# Patient Record
Sex: FEMALE | Race: WHITE
[De-identification: names, ages, dates, MRNs, and addresses within clinical notes are randomized per-mention and may not be internally consistent; named-entity substitution may affect disease eponyms.]

---

## 2018-03-27 ENCOUNTER — HOSPITAL ENCOUNTER (OUTPATIENT)
Dept: HOSPITAL 47 - RADCTMAIN | Age: 33
Discharge: HOME | End: 2018-03-27
Payer: COMMERCIAL

## 2018-03-27 DIAGNOSIS — R10.9: Primary | ICD-10-CM

## 2018-03-27 PROCEDURE — 74175 CTA ABDOMEN W/CONTRAST: CPT

## 2018-03-27 NOTE — CT
EXAMINATION TYPE: CT angio abdomen

 

DATE OF EXAM: 3/27/2018 4:56 PM

 

COMPARISON: NONE

 

HISTORY: Patient complains of LUQ pain, RLQ pain, diarrhea, and constipation. Father was recently carolyn
gnosed with median arcuate ligament syndrome.

 

CT DLP: 497 mGycm

Automated exposure control for dose reduction was used.

 

TECHNIQUE: 

Performed without and with IV Contrast, patient injected with 100 mL of Isovue 370.

. 

 

FINDINGS: 

Lung bases are clear of consolidation. There is no pleural effusion. Heart size is normal. Liver sple
en pancreas gallbladder appear normal. Kidneys have normal size and contour. There is normal contrast
 opacification of the kidneys.

 

The abdominal aorta has normal size. There is no evidence of aneurysm or dissection. There is bilater
al wide patency of the common internal and external iliac arteries in the pelvis. There is no retrope
ritoneal adenopathy.

 

There is wide patency of the celiac artery and the superior mesenteric artery. There is wide patency 
of the renal arteries.

 

IMPRESSION: 

NORMAL CT ANGIOGRAM OF THE ABDOMINAL AORTA.

## 2018-09-18 ENCOUNTER — HOSPITAL ENCOUNTER (OUTPATIENT)
Dept: HOSPITAL 47 - RADNMMAIN | Age: 33
End: 2018-09-18
Payer: COMMERCIAL

## 2018-09-18 DIAGNOSIS — R10.9: Primary | ICD-10-CM

## 2018-09-18 PROCEDURE — 78226 HEPATOBILIARY SYSTEM IMAGING: CPT

## 2018-09-18 NOTE — NM
EXAMINATION TYPE: NM hepatobiliary w EF

 

DATE OF EXAM: 9/18/2018

 

COMPARISON: NONE

 

HISTORY: R 10.9, abdomen pain

 

TECHNIQUE: After the intravenous administration of 5.0 mCi Tc 99m Mebrofenin hepatobiliary scintigrap
hy is performed.  Immediate images post injection.

 

FINDINGS: 

There is satisfactory initial accumulation of tracer by the liver.  The gallbladder is visualized wit
hin 4 minutes.  The small bowel activity is noted within 30 minutes.  At one hour 8 ounces of oral en
sure plus is given to mimic CCK and gallbladder ejection fraction is calculated at 39 %, in the eder
l range.  Therefore there is no scintigraphic evidence of cystic or common bile duct obstruction to s
uggest acute cholecystitis or gallbladder dyskinesia. 

 

IMPRESSION: Exam is within normal limits.